# Patient Record
Sex: MALE | Race: WHITE | NOT HISPANIC OR LATINO | Employment: FULL TIME | URBAN - METROPOLITAN AREA
[De-identification: names, ages, dates, MRNs, and addresses within clinical notes are randomized per-mention and may not be internally consistent; named-entity substitution may affect disease eponyms.]

---

## 2020-06-01 ENCOUNTER — HOSPITAL ENCOUNTER (EMERGENCY)
Facility: HOSPITAL | Age: 25
Discharge: HOME | End: 2020-06-01
Attending: EMERGENCY MEDICINE | Admitting: EMERGENCY MEDICINE

## 2020-06-01 ENCOUNTER — APPOINTMENT (OUTPATIENT)
Dept: GENERAL RADIOLOGY | Facility: HOSPITAL | Age: 25
End: 2020-06-01

## 2020-06-01 VITALS
HEIGHT: 74 IN | RESPIRATION RATE: 16 BRPM | OXYGEN SATURATION: 99 % | DIASTOLIC BLOOD PRESSURE: 79 MMHG | WEIGHT: 255.29 LBS | TEMPERATURE: 98.4 F | SYSTOLIC BLOOD PRESSURE: 133 MMHG | BODY MASS INDEX: 32.76 KG/M2 | HEART RATE: 90 BPM

## 2020-06-01 DIAGNOSIS — J06.9 UPPER RESPIRATORY TRACT INFECTION, UNSPECIFIED TYPE: Primary | ICD-10-CM

## 2020-06-01 LAB
C TRACH RRNA CVX QL NAA+PROBE: NOT DETECTED
N GONORRHOEA RRNA SPEC QL NAA+PROBE: NOT DETECTED
S PYO AG THROAT QL: NEGATIVE
SARS-COV-2 RNA RESP QL NAA+PROBE: NOT DETECTED

## 2020-06-01 PROCEDURE — 87651 STREP A DNA AMP PROBE: CPT | Performed by: EMERGENCY MEDICINE

## 2020-06-01 PROCEDURE — 99283 EMERGENCY DEPT VISIT LOW MDM: CPT

## 2020-06-01 PROCEDURE — 87591 N.GONORRHOEAE DNA AMP PROB: CPT | Performed by: EMERGENCY MEDICINE

## 2020-06-01 PROCEDURE — 87635 SARS-COV-2 COVID-19 AMP PRB: CPT | Performed by: EMERGENCY MEDICINE

## 2020-06-01 PROCEDURE — 87491 CHLMYD TRACH DNA AMP PROBE: CPT | Performed by: EMERGENCY MEDICINE

## 2020-06-01 PROCEDURE — 71045 X-RAY EXAM CHEST 1 VIEW: CPT

## 2020-06-01 RX ORDER — ACETAMINOPHEN 500 MG
500 TABLET ORAL EVERY 6 HOURS PRN
COMMUNITY

## 2020-06-01 NOTE — DISCHARGE INSTRUCTIONS
Follow-up with your primary doctor.  Return to the emergency room for any new or worsening symptoms or if you have any other questions or concerns.  Quarantine at home until results of COVID-19 testing are available.

## 2020-06-01 NOTE — ED PROVIDER NOTES
"Subjective   Chief complaint: Fever    25-year-old male presents with fever.  Patient states symptoms have been present for 3 days.  T-max was 102.  He had his wisdom teeth removed in the middle of May.  He states he went back to the surgeon today to see if that was what was causing his fever but they said everything looked fine from their standpoint.  Patient does report that he has had a sore throat, general fatigue, mild shortness of breath.  He denies any cough.  No known sick contacts.  He denies any alleviating or exacerbating factors.  He also states he would like to be tested for STDs.  He denies any urinary symptoms but states he is just concerned and would like to be tested.      History provided by:  Patient      Review of Systems   Constitutional: Positive for fatigue and fever.   HENT: Positive for sore throat. Negative for congestion.    Eyes: Negative for pain and redness.   Respiratory: Positive for shortness of breath. Negative for cough.    Cardiovascular: Negative for chest pain.   Gastrointestinal: Negative for abdominal pain, diarrhea and vomiting.   Genitourinary: Negative for discharge and dysuria.   Musculoskeletal: Negative for back pain.   Skin: Negative for rash.   Neurological: Negative for dizziness and headaches.   Psychiatric/Behavioral: Negative for behavioral problems and confusion.       No past medical history on file.    Allergies no known allergies    No past surgical history on file.    No family history on file.    Social History     Socioeconomic History   • Marital status: Single     Spouse name: Not on file   • Number of children: Not on file   • Years of education: Not on file   • Highest education level: Not on file       /81   Pulse 91   Temp 98.3 °F (36.8 °C) (Oral)   Resp 14   Ht 188 cm (74\")   Wt 116 kg (255 lb 4.7 oz)   SpO2 99%   BMI 32.78 kg/m²       Objective   Physical Exam   Constitutional: He is oriented to person, place, and time. He appears " well-developed and well-nourished.   HENT:   Head: Normocephalic and atraumatic.   Mild erythema in the posterior oropharynx, there is no exudate, uvula midline   Eyes: Pupils are equal, round, and reactive to light.   Neck: Normal range of motion. Neck supple.   Cardiovascular: Normal rate, regular rhythm and normal heart sounds.   Pulmonary/Chest: Effort normal and breath sounds normal. No respiratory distress.   Abdominal: Soft. Bowel sounds are normal. There is no tenderness.   Musculoskeletal: Normal range of motion.   Neurological: He is alert and oriented to person, place, and time.   Skin: Skin is warm and dry.   Nursing note and vitals reviewed.      Procedures           ED Course      Results for orders placed or performed during the hospital encounter of 06/01/20   Rapid Strep A Screen - Swab, Throat   Result Value Ref Range    Strep A Ag Negative Negative     Xr Chest 1 View    Result Date: 6/1/2020  No acute chest findings.  Electronically Signed By-Dr. Leila Borja MD On:6/1/2020 1:56 PM This report was finalized on [id number] by Dr. Leila Borja MD.                                         MDM   Strep screen is negative.  Chest x-ray shows no acute disease.  COVID-19 testing is pending at this time.  GC and Chlamydia PCR also pending.  Patient will be discharged and will quarantine at home until COVID-19 results are available.      Final diagnoses:   Upper respiratory tract infection, unspecified type            Uzair Leo MD  06/01/20 4041

## 2020-06-02 ENCOUNTER — EPISODE CHANGES (OUTPATIENT)
Dept: CASE MANAGEMENT | Facility: OTHER | Age: 25
End: 2020-06-02

## 2020-06-03 ENCOUNTER — OFFICE VISIT (OUTPATIENT)
Dept: FAMILY MEDICINE CLINIC | Facility: CLINIC | Age: 25
End: 2020-06-03

## 2020-06-03 ENCOUNTER — LAB (OUTPATIENT)
Dept: LAB | Facility: HOSPITAL | Age: 25
End: 2020-06-03

## 2020-06-03 ENCOUNTER — PATIENT OUTREACH (OUTPATIENT)
Dept: CASE MANAGEMENT | Facility: OTHER | Age: 25
End: 2020-06-03

## 2020-06-03 VITALS
HEART RATE: 105 BPM | DIASTOLIC BLOOD PRESSURE: 82 MMHG | WEIGHT: 250.4 LBS | TEMPERATURE: 99 F | BODY MASS INDEX: 32.14 KG/M2 | RESPIRATION RATE: 20 BRPM | OXYGEN SATURATION: 97 % | HEIGHT: 74 IN | SYSTOLIC BLOOD PRESSURE: 114 MMHG
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
## 2020-06-03 DIAGNOSIS — J02.9 PHARYNGITIS, UNSPECIFIED ETIOLOGY: ICD-10-CM

## 2020-06-03 DIAGNOSIS — R50.9 FEVER, UNSPECIFIED FEVER CAUSE: Primary | ICD-10-CM

## 2020-06-03 LAB
BASOPHILS # BLD AUTO: 0.01 10*3/MM3 (ref 0–0.2)
BASOPHILS NFR BLD AUTO: 0.3 % (ref 0–1.5)
DEPRECATED RDW RBC AUTO: 36.9 FL (ref 37–54)
EOSINOPHIL # BLD AUTO: 0.01 10*3/MM3 (ref 0–0.4)
EOSINOPHIL NFR BLD AUTO: 0.3 % (ref 0.3–6.2)
ERYTHROCYTE [DISTWIDTH] IN BLOOD BY AUTOMATED COUNT: 12 % (ref 12.3–15.4)
HCT VFR BLD AUTO: 42.8 % (ref 37.5–51)
HETEROPH AB SER QL LA: NEGATIVE
HGB BLD-MCNC: 14.4 G/DL (ref 13–17.7)
IMM GRANULOCYTES # BLD AUTO: 0.01 10*3/MM3 (ref 0–0.05)
IMM GRANULOCYTES NFR BLD AUTO: 0.3 % (ref 0–0.5)
LYMPHOCYTES # BLD AUTO: 1.02 10*3/MM3 (ref 0.7–3.1)
LYMPHOCYTES NFR BLD AUTO: 30.1 % (ref 19.6–45.3)
MCH RBC QN AUTO: 28.9 PG (ref 26.6–33)
MCHC RBC AUTO-ENTMCNC: 33.6 G/DL (ref 31.5–35.7)
MCV RBC AUTO: 85.8 FL (ref 79–97)
MONOCYTES # BLD AUTO: 0.48 10*3/MM3 (ref 0.1–0.9)
MONOCYTES NFR BLD AUTO: 14.2 % (ref 5–12)
NEUTROPHILS # BLD AUTO: 1.86 10*3/MM3 (ref 1.7–7)
NEUTROPHILS NFR BLD AUTO: 54.8 % (ref 42.7–76)
NRBC BLD AUTO-RTO: 0 /100 WBC (ref 0–0.2)
PLATELET # BLD AUTO: 172 10*3/MM3 (ref 140–450)
PMV BLD AUTO: 11.8 FL (ref 6–12)
RBC # BLD AUTO: 4.99 10*6/MM3 (ref 4.14–5.8)
WBC NRBC COR # BLD: 3.39 10*3/MM3 (ref 3.4–10.8)

## 2020-06-03 PROCEDURE — 86666 EHRLICHIA ANTIBODY: CPT | Performed by: NURSE PRACTITIONER

## 2020-06-03 PROCEDURE — 86645 CMV ANTIBODY IGM: CPT | Performed by: NURSE PRACTITIONER

## 2020-06-03 PROCEDURE — 99214 OFFICE O/P EST MOD 30 MIN: CPT | Performed by: NURSE PRACTITIONER

## 2020-06-03 PROCEDURE — 86644 CMV ANTIBODY: CPT | Performed by: NURSE PRACTITIONER

## 2020-06-03 PROCEDURE — 85025 COMPLETE CBC W/AUTO DIFF WBC: CPT | Performed by: NURSE PRACTITIONER

## 2020-06-03 PROCEDURE — 86665 EPSTEIN-BARR CAPSID VCA: CPT | Performed by: NURSE PRACTITIONER

## 2020-06-03 PROCEDURE — 86618 LYME DISEASE ANTIBODY: CPT | Performed by: NURSE PRACTITIONER

## 2020-06-03 PROCEDURE — 86308 HETEROPHILE ANTIBODY SCREEN: CPT | Performed by: NURSE PRACTITIONER

## 2020-06-03 PROCEDURE — 86622 BRUCELLA ANTIBODY: CPT | Performed by: NURSE PRACTITIONER

## 2020-06-03 PROCEDURE — 36415 COLL VENOUS BLD VENIPUNCTURE: CPT | Performed by: NURSE PRACTITIONER

## 2020-06-03 PROCEDURE — 86757 RICKETTSIA ANTIBODY: CPT | Performed by: NURSE PRACTITIONER

## 2020-06-03 NOTE — PROGRESS NOTES
Aye Ramirez is a 25 y.o. male.     Chief Complaint   Patient presents with   • Fever     X 5 days   • Sore Throat       HPI  Patient is here for continued fever after seen in ED 6/1/2020. He was covid tested was negative and strep was negative. He continues to have fever worse at night wakes up in sweat 4 days. Since sat Tmax 102. Says still running 100-101.     Fever: started sat 102 max had sore throat. drng in throat started yesterday occl cough. He is not smoker. He has had nausea and headache occassional left ear pain. He is taking tylenol for pain and fever helps short time. No rash. Always in woods picks tics off .         The following portions of the patient's history were reviewed and updated as appropriate: allergies, current medications, past family history, past medical history, past social history, past surgical history and problem list.      Current Outpatient Medications:   •  acetaminophen (TYLENOL) 500 MG tablet, Take 500 mg by mouth Every 6 (Six) Hours As Needed for Mild Pain ., Disp: , Rfl:     Recent Results (from the past 4032 hour(s))   Chlamydia trachomatis, Neisseria gonorrhoeae, PCR - Urine, Urine, Clean Catch    Collection Time: 06/01/20  1:04 PM   Result Value Ref Range    Chlamydia DNA by PCR Not Detected Not Detected    Neisseria gonorrhoeae by PCR Not Detected Not Detected   Rapid Strep A Screen - Swab, Throat    Collection Time: 06/01/20  1:06 PM   Result Value Ref Range    Strep A Ag Negative Negative   COVID-19,[facility], NP SWAB IN TRANSPORT MEDIA 8-12 HR TAT - Swab, Nasopharynx    Collection Time: 06/01/20  1:11 PM   Result Value Ref Range    COVID19 Not Detected Not Detected - Ref. Range         Review of Systems   Constitutional: Positive for fever.   HENT: Positive for postnasal drip and sore throat.    Respiratory: Positive for cough.    Skin: Negative.    Neurological: Positive for headache.       Objective     /82 (BP Location: Left arm, Patient  "Position: Sitting, Cuff Size: Large Adult)   Pulse 105   Temp 99 °F (37.2 °C) (Oral)   Resp 20   Ht 188 cm (74\")   Wt 114 kg (250 lb 6.4 oz)   SpO2 97%   BMI 32.15 kg/m²     Physical Exam   Constitutional: He is oriented to person, place, and time. He appears well-developed and well-nourished.   HENT:   Head: Normocephalic and atraumatic.   Eyes: Pupils are equal, round, and reactive to light. Conjunctivae and EOM are normal.   Neck: Normal range of motion. Neck supple.   Cardiovascular: Normal rate, regular rhythm, normal heart sounds and intact distal pulses.   Pulmonary/Chest: Effort normal and breath sounds normal.   Abdominal: Soft. Bowel sounds are normal.   Musculoskeletal: Normal range of motion.   Neurological: He is alert and oriented to person, place, and time.   Skin: Skin is warm and dry.        Psychiatric: He has a normal mood and affect. His behavior is normal.   Nursing note and vitals reviewed.        Assessment/Plan   Wan was seen today for fever and sore throat.    Diagnoses and all orders for this visit:    Fever, unspecified fever cause  -     Mononucleosis Screen  -     Bryant-Barr Virus VCA, IgM  -     EBV Antibody VCA, IgG  -     Cytomegalovirus Antibody, IgG  -     Cytomegalovirus Antibody, IgM  -     Tick Panel - Blood, Arm, Left    Pharyngitis, unspecified etiology  -     Mononucleosis Screen  -     Bryant-Barr Virus VCA, IgM  -     EBV Antibody VCA, IgG  -     Cytomegalovirus Antibody, IgG  -     Cytomegalovirus Antibody, IgM    Tick bite, initial encounter  -     CBC & Differential  -     Tick Panel - Blood, Arm, Left      Patient Instructions   Treat symptoms. Fluids, tylenol for fever. Or take motrin.   Follow up on labs.       Cassidy Garzon, ERIKA    06/03/20      "

## 2020-06-03 NOTE — OUTREACH NOTE
ED Potential Covid Discharge Follow-up    Pt discharged from [facility] on 6/1/20, seen for fever, covid 19 testing performed. [facility] outreach call made to pt. Able to reach pt on 2nd attempt. Pt reports his mother received a call from the health dept that his covid test result was negative. Pt c/o fever, states his temp was 99 about 1 hour ago. Pt denies shortness of breath, c/o mild CP at times. Reviewed ED AVS with pt. Education provided. Pt states he saw his PCP for follow up this am, noted in chart. Pt states she ordered blood work and is supposed to call him Friday or Monday with the results. Pt denies food, medication, or transportation insecurities. [facility] 24 hour nurse line number given to pt, pt declines covid hotline number. Advised pt to call [facility] or [facility] nurse line with any needs. Follow up outreach as needed.     Simeon Pineda RN  Ambulatory     6/3/2020, 12:25

## 2020-06-04 LAB
CMV IGG SERPL IA-ACNC: <0.6 U/ML (ref 0–0.59)
CMV IGM SERPL IA-ACNC: <30 AU/ML (ref 0–29.9)
EBV VCA IGG SER-ACNC: 319 U/ML (ref 0–17.9)
EBV VCA IGM SER-ACNC: <36 U/ML (ref 0–35.9)

## 2020-06-05 ENCOUNTER — TELEPHONE (OUTPATIENT)
Dept: FAMILY MEDICINE CLINIC | Facility: CLINIC | Age: 25
End: 2020-06-05

## 2020-06-05 LAB
A PHAGOCYTOPH IGM TITR SER IF: NEGATIVE {TITER}
B BURGDOR IGG SER QL: NEGATIVE
CONV HGE IGG TITER: NEGATIVE
E CHAFFEENSIS IGG TITR SER IF: NEGATIVE {TITER}
E. CHAFFEENSIS (HME) IGM TITER: NEGATIVE

## 2020-06-08 ENCOUNTER — TELEPHONE (OUTPATIENT)
Dept: FAMILY MEDICINE CLINIC | Facility: CLINIC | Age: 25
End: 2020-06-08

## 2020-06-08 NOTE — TELEPHONE ENCOUNTER
The patient was seen in the office last week. He states his note from our office released him to return to work today. He states he is unable to return and his work will be faxing us [place] paperwork for you to fill out. He also has questions regarding his lab results and would like for you to call him.

## 2020-06-08 NOTE — TELEPHONE ENCOUNTER
He now has cough. Temp Friday was 100. He feels he is unable to work. Just feeling tired,. Wants [facility] paper work filled out cant' work today

## 2020-06-09 ENCOUNTER — LAB (OUTPATIENT)
Dept: LAB | Facility: HOSPITAL | Age: 25
End: 2020-06-09

## 2020-06-09 ENCOUNTER — OFFICE VISIT (OUTPATIENT)
Dept: FAMILY MEDICINE CLINIC | Facility: CLINIC | Age: 25
End: 2020-06-09

## 2020-06-09 ENCOUNTER — HOSPITAL ENCOUNTER (OUTPATIENT)
Dept: GENERAL RADIOLOGY | Facility: HOSPITAL | Age: 25
Discharge: HOME | End: 2020-06-09
Admitting: NURSE PRACTITIONER

## 2020-06-09 VITALS
BODY MASS INDEX: 31.58 KG/M2 | WEIGHT: 246.1 LBS | OXYGEN SATURATION: 97 % | TEMPERATURE: 97.8 F | HEIGHT: 74 IN | SYSTOLIC BLOOD PRESSURE: 112 MMHG | HEART RATE: 108 BPM | RESPIRATION RATE: 18 BRPM | DIASTOLIC BLOOD PRESSURE: 78 MMHG

## 2020-06-09 DIAGNOSIS — R05.9 COUGH: ICD-10-CM

## 2020-06-09 DIAGNOSIS — R53.83 OTHER FATIGUE: ICD-10-CM

## 2020-06-09 DIAGNOSIS — B27.00 EBV (EPSTEIN-BARR VIRUS) VIREMIA: Primary | ICD-10-CM

## 2020-06-09 LAB
ANION GAP SERPL CALCULATED.3IONS-SCNC: 11.7 MMOL/L (ref 5–15)
BRUCELLA IGG SER QL IA: NEGATIVE
BRUCELLA IGM SER QL IA: NEGATIVE
BUN BLD-MCNC: 10 MG/DL (ref 6–20)
BUN/CREAT SERPL: 11.6 (ref 7–25)
CALCIUM SPEC-SCNC: 9.7 MG/DL (ref 8.6–10.5)
CHLORIDE SERPL-SCNC: 99 MMOL/L (ref 98–107)
CO2 SERPL-SCNC: 26.3 MMOL/L (ref 22–29)
CREAT BLD-MCNC: 0.86 MG/DL (ref 0.76–1.27)
DEPRECATED RDW RBC AUTO: 36.9 FL (ref 37–54)
EOSINOPHIL # BLD MANUAL: 0.49 10*3/MM3 (ref 0–0.4)
EOSINOPHIL NFR BLD MANUAL: 7 % (ref 0.3–6.2)
ERYTHROCYTE [DISTWIDTH] IN BLOOD BY AUTOMATED COUNT: 12 % (ref 12.3–15.4)
GFR SERPL CREATININE-BSD FRML MDRD: 108 ML/MIN/1.73
GLUCOSE BLD-MCNC: 82 MG/DL (ref 65–99)
HCT VFR BLD AUTO: 42.8 % (ref 37.5–51)
HGB BLD-MCNC: 14.3 G/DL (ref 13–17.7)
LYMPHOCYTES # BLD MANUAL: 1.61 10*3/MM3 (ref 0.7–3.1)
LYMPHOCYTES NFR BLD MANUAL: 23 % (ref 19.6–45.3)
LYMPHOCYTES NFR BLD MANUAL: 4 % (ref 5–12)
MCH RBC QN AUTO: 28.5 PG (ref 26.6–33)
MCHC RBC AUTO-ENTMCNC: 33.4 G/DL (ref 31.5–35.7)
MCV RBC AUTO: 85.4 FL (ref 79–97)
MONOCYTES # BLD AUTO: 0.28 10*3/MM3 (ref 0.1–0.9)
NEUTROPHILS # BLD AUTO: 4.63 10*3/MM3 (ref 1.7–7)
NEUTROPHILS NFR BLD MANUAL: 66 % (ref 42.7–76)
PLAT MORPH BLD: NORMAL
PLATELET # BLD AUTO: 294 10*3/MM3 (ref 140–450)
PMV BLD AUTO: 11.2 FL (ref 6–12)
POTASSIUM BLD-SCNC: 4.2 MMOL/L (ref 3.5–5.2)
RBC # BLD AUTO: 5.01 10*6/MM3 (ref 4.14–5.8)
RBC MORPH BLD: NORMAL
RICK SF IGG TITR SER IF: NORMAL {TITER}
RICK SF IGM TITR SER IF: NORMAL {TITER}
SODIUM BLD-SCNC: 137 MMOL/L (ref 136–145)
TYPHUS FEVER GROUP IGG: NORMAL
TYPHUS FEVER GROUP IGM: NORMAL
WBC MORPH BLD: NORMAL
WBC NRBC COR # BLD: 7.02 10*3/MM3 (ref 3.4–10.8)

## 2020-06-09 PROCEDURE — 71046 X-RAY EXAM CHEST 2 VIEWS: CPT

## 2020-06-09 PROCEDURE — 85025 COMPLETE CBC W/AUTO DIFF WBC: CPT

## 2020-06-09 PROCEDURE — 80048 BASIC METABOLIC PNL TOTAL CA: CPT

## 2020-06-09 PROCEDURE — 99213 OFFICE O/P EST LOW 20 MIN: CPT | Performed by: NURSE PRACTITIONER

## 2020-06-09 PROCEDURE — 36415 COLL VENOUS BLD VENIPUNCTURE: CPT

## 2020-06-09 PROCEDURE — 85007 BL SMEAR W/DIFF WBC COUNT: CPT

## 2020-06-09 NOTE — PROGRESS NOTES
Subjective   Wan Ramirez is a 25 y.o. male.     Chief Complaint   Patient presents with   • Fatigue     Weakness and Fever. Says only inprovement is no sore throat.        HPI  Patient says he is still just weak exhausted. He said he started with cough sat. Not coughing anything up.   He said he feels he can't do his job. His job causes mental fatigue and some physical works with tools and his hands. Tmax 100 that was Friday.   Gets spurt of energy feels fine goes lays down . Friday and sat he just layed around.         The following portions of the patient's history were reviewed and updated as appropriate: allergies, current medications, past family history, past medical history, past social history, past surgical history and problem list.      Current Outpatient Medications:   •  acetaminophen (TYLENOL) 500 MG tablet, Take 500 mg by mouth Every 6 (Six) Hours As Needed for Mild Pain ., Disp: , Rfl:     Recent Results (from the past 4032 hour(s))   Chlamydia trachomatis, Neisseria gonorrhoeae, PCR - Urine, Urine, Clean Catch    Collection Time: 06/01/20  1:04 PM   Result Value Ref Range    Chlamydia DNA by PCR Not Detected Not Detected    Neisseria gonorrhoeae by PCR Not Detected Not Detected   Rapid Strep A Screen - Swab, Throat    Collection Time: 06/01/20  1:06 PM   Result Value Ref Range    Strep A Ag Negative Negative   COVID-19,[facility], NP SWAB IN TRANSPORT MEDIA 8-12 HR TAT - Swab, Nasopharynx    Collection Time: 06/01/20  1:11 PM   Result Value Ref Range    COVID19 Not Detected Not Detected - Ref. Range   Mononucleosis Screen    Collection Time: 06/03/20 11:45 AM   Result Value Ref Range    Monospot Negative Negative   Bryant-Barr Virus VCA, IgM    Collection Time: 06/03/20 11:45 AM   Result Value Ref Range    EBV VCA IgM <36.0 0.0 - 35.9 U/mL   EBV Antibody VCA, IgG    Collection Time: 06/03/20 11:45 AM   Result Value Ref Range    EBV VCA IgG 319.0 (H) 0.0 - 17.9 U/mL   Cytomegalovirus Antibody,  IgG    Collection Time: 06/03/20 11:45 AM   Result Value Ref Range    CMV IgG <0.60 0.00 - 0.59 U/mL   Cytomegalovirus Antibody, IgM    Collection Time: 06/03/20 11:45 AM   Result Value Ref Range    CMV IgM <30.0 0.0 - 29.9 AU/mL   CBC Auto Differential    Collection Time: 06/03/20 11:45 AM   Result Value Ref Range    WBC 3.39 (L) 3.40 - 10.80 10*3/mm3    RBC 4.99 4.14 - 5.80 10*6/mm3    Hemoglobin 14.4 13.0 - 17.7 g/dL    Hematocrit 42.8 37.5 - 51.0 %    MCV 85.8 79.0 - 97.0 fL    MCH 28.9 26.6 - 33.0 pg    MCHC 33.6 31.5 - 35.7 g/dL    RDW 12.0 (L) 12.3 - 15.4 %    RDW-SD 36.9 (L) 37.0 - 54.0 fl    MPV 11.8 6.0 - 12.0 fL    Platelets 172 140 - 450 10*3/mm3    Neutrophil % 54.8 42.7 - 76.0 %    Lymphocyte % 30.1 19.6 - 45.3 %    Monocyte % 14.2 (H) 5.0 - 12.0 %    Eosinophil % 0.3 0.3 - 6.2 %    Basophil % 0.3 0.0 - 1.5 %    Immature Grans % 0.3 0.0 - 0.5 %    Neutrophils, Absolute 1.86 1.70 - 7.00 10*3/mm3    Lymphocytes, Absolute 1.02 0.70 - 3.10 10*3/mm3    Monocytes, Absolute 0.48 0.10 - 0.90 10*3/mm3    Eosinophils, Absolute 0.01 0.00 - 0.40 10*3/mm3    Basophils, Absolute 0.01 0.00 - 0.20 10*3/mm3    Immature Grans, Absolute 0.01 0.00 - 0.05 10*3/mm3    nRBC 0.0 0.0 - 0.2 /100 WBC   E. Chaffeenis-HME (Monocytic)    Collection Time: 06/03/20 11:45 AM   Result Value Ref Range    E. chaffeensis (HME) IgG Titer Negative Neg:<1:64    E. chaffeensis (HME) IgM Titer Negative Neg:<1:20   Human Granulocytic Sajan-HGE    Collection Time: 06/03/20 11:45 AM   Result Value Ref Range    HGE IgG Titer Negative Neg:<1:64    HGE IgM Titer Negative Neg:<1:20   Lyme Antibody IgG    Collection Time: 06/03/20 11:45 AM   Result Value Ref Range    Lyme Ab IgG Negative Negative         Review of Systems   Constitutional: Positive for fatigue.   Respiratory: Positive for cough.    Neurological: Negative for light-headedness, numbness and memory problem.       Objective     /78 (BP Location: Right arm, Patient Position:  "Sitting, Cuff Size: Large Adult)   Pulse 108   Temp 97.8 °F (36.6 °C) (Temporal)   Resp 18   Ht 188 cm (74\")   Wt 112 kg (246 lb 1.6 oz)   SpO2 97%   BMI 31.60 kg/m²     Physical Exam   Constitutional: He is oriented to person, place, and time. He appears well-developed and well-nourished.   HENT:   Head: Normocephalic and atraumatic.   Right Ear: External ear normal.   Left Ear: External ear normal.   Nose: Nose normal.   Mouth/Throat: Oropharynx is clear and moist. No oropharyngeal exudate.   Eyes: Pupils are equal, round, and reactive to light. Conjunctivae and EOM are normal.   Neck: Normal range of motion. Neck supple.   Cardiovascular: Normal rate, regular rhythm, normal heart sounds and intact distal pulses.   Pulmonary/Chest: Effort normal and breath sounds normal.           Abdominal: Soft. Bowel sounds are normal.   Musculoskeletal: Normal range of motion.   Neurological: He is alert and oriented to person, place, and time.   Skin: Skin is warm and dry.   Psychiatric: He has a normal mood and affect. His behavior is normal.   Nursing note and vitals reviewed.        Assessment/Plan   Wan was seen today for fatigue.    Diagnoses and all orders for this visit:    EBV (Bryant-Barr virus) viremia    Cough  -     XR Chest 2 View; Future    Other fatigue  -     CBC & Differential; Future  -     Basic Metabolic Panel; Future      Patient Instructions   folow up on test results. Rest fluids, eat when can. Eat healthy.       Cassidy Garzon, APRN    06/09/20      "

## 2020-06-10 ENCOUNTER — TELEPHONE (OUTPATIENT)
Dept: FAMILY MEDICINE CLINIC | Facility: CLINIC | Age: 25
End: 2020-06-10

## 2023-01-20 ENCOUNTER — TELEPHONE (OUTPATIENT)
Dept: FAMILY MEDICINE CLINIC | Facility: CLINIC | Age: 28
End: 2023-01-20
Payer: COMMERCIAL